# Patient Record
Sex: FEMALE | Race: WHITE | NOT HISPANIC OR LATINO | Employment: FULL TIME | URBAN - METROPOLITAN AREA
[De-identification: names, ages, dates, MRNs, and addresses within clinical notes are randomized per-mention and may not be internally consistent; named-entity substitution may affect disease eponyms.]

---

## 2017-01-04 ENCOUNTER — OFFICE VISIT (OUTPATIENT)
Dept: FAMILY MEDICINE | Facility: HOSPITAL | Age: 27
End: 2017-01-04
Attending: FAMILY MEDICINE
Payer: MEDICAID

## 2017-01-04 VITALS
RESPIRATION RATE: 20 BRPM | SYSTOLIC BLOOD PRESSURE: 118 MMHG | WEIGHT: 137.56 LBS | DIASTOLIC BLOOD PRESSURE: 69 MMHG | HEART RATE: 63 BPM | BODY MASS INDEX: 22.89 KG/M2

## 2017-01-04 DIAGNOSIS — G43.009 MIGRAINE WITHOUT AURA AND WITHOUT STATUS MIGRAINOSUS, NOT INTRACTABLE: ICD-10-CM

## 2017-01-04 DIAGNOSIS — Z30.41 ENCOUNTER FOR BIRTH CONTROL PILLS MAINTENANCE: Primary | ICD-10-CM

## 2017-01-04 PROCEDURE — 99213 OFFICE O/P EST LOW 20 MIN: CPT | Performed by: FAMILY MEDICINE

## 2017-01-04 RX ORDER — NORETHINDRONE ACETATE AND ETHINYL ESTRADIOL .02; 1 MG/1; MG/1
1 TABLET ORAL DAILY
Qty: 30 TABLET | Refills: 11 | Status: SHIPPED | OUTPATIENT
Start: 2017-01-04 | End: 2017-04-03

## 2017-01-04 RX ORDER — SUMATRIPTAN SUCCINATE 25 MG/1
25 TABLET ORAL
Qty: 30 TABLET | Refills: 3 | Status: SHIPPED | OUTPATIENT
Start: 2017-01-04

## 2017-01-04 NOTE — PROGRESS NOTES
I assume primary medical responsibility for this patient, I have reviewed the case history, findings, diagnosis and treatment plan with the resident and agree that the care is reasonable and necessary. This service has been performed by a resident without the presence of a teaching physician under the primary care exception  Ana Childress  1/4/2017

## 2017-01-04 NOTE — PROGRESS NOTES
Subjective:       Patient ID: Paige Kilgore is a 26 y.o. female.    Chief Complaint: Medication Refill    HPI   25 y/o female with PMH of migraines presents today for medication refill. Pt states she is feeling well and has no complaints today. She denies fever, chills, SOB, CP, n/v/d.     Review of Systems   Constitutional: Negative for chills and fever.   HENT: Negative for congestion, rhinorrhea and sore throat.    Eyes: Negative for discharge and redness.   Respiratory: Negative for cough, shortness of breath, wheezing and stridor.    Cardiovascular: Negative for chest pain, palpitations and leg swelling.   Gastrointestinal: Negative for abdominal distention, abdominal pain, blood in stool, constipation, diarrhea, nausea and vomiting.   Genitourinary: Negative for difficulty urinating, dysuria and hematuria.   Musculoskeletal: Negative for back pain and neck pain.   Skin: Negative for rash.   Neurological: Negative for dizziness, light-headedness and headaches.   Psychiatric/Behavioral: Negative for agitation, behavioral problems and confusion.   All other systems reviewed and are negative.      Objective:      Vitals:    01/04/17 1031   BP: 118/69   Pulse: 63   Resp: 20     Physical Exam   Constitutional: She is oriented to person, place, and time. She appears well-developed and well-nourished. No distress.   HENT:   Head: Normocephalic and atraumatic.   Right Ear: External ear normal.   Left Ear: External ear normal.   Nose: Nose normal.   Mouth/Throat: Oropharynx is clear and moist. No oropharyngeal exudate.   Eyes: Conjunctivae and EOM are normal. Pupils are equal, round, and reactive to light. Right eye exhibits no discharge. Left eye exhibits no discharge. No scleral icterus.   Neck: Normal range of motion. Neck supple. No tracheal deviation present. No thyromegaly present.   Cardiovascular: Normal rate, regular rhythm, normal heart sounds and intact distal pulses.  Exam reveals no gallop and no friction  rub.    No murmur heard.  Pulmonary/Chest: Effort normal and breath sounds normal. No respiratory distress. She has no wheezes. She has no rales. She exhibits no tenderness.   Abdominal: Soft. Bowel sounds are normal. She exhibits no distension and no mass. There is no tenderness.   Musculoskeletal: Normal range of motion. She exhibits no edema or tenderness.   Lymphadenopathy:     She has no cervical adenopathy.   Neurological: She is alert and oriented to person, place, and time.   Skin: Skin is warm. No rash noted. She is not diaphoretic. No erythema.   Psychiatric: She has a normal mood and affect. Her behavior is normal. Judgment and thought content normal.   Nursing note and vitals reviewed.      Assessment:       1. Encounter for birth control pills maintenance    2. Migraine without aura and without status migrainosus, not intractable        Plan:       Encounter for birth control pills maintenance    Migraine without aura and without status migrainosus, not intractable    Other orders  -     norethindrone-ethinyl estradiol (MICROGESTIN 1/20) 1-20 mg-mcg per tablet; Take 1 tablet by mouth once daily.  Dispense: 30 tablet; Refill: 11  -     sumatriptan (IMITREX) 25 MG Tab; Take 1 tablet (25 mg total) by mouth every 2 (two) hours as needed (do not exceed 100 mg per day).  Dispense: 30 tablet; Refill: 3    Pt was interviewed and examined. Refilled birth control and wrote for sumatriptan for migraines. Pt did not want to try propranolol for migraine prophylaxis because she didn't want to have to take medication every day and she doesn't feel like she gets the HA's often enough to warrant another medication. I discussed with her the pros and cons of different HA medications.   Return in about 6 months (around 7/4/2017).    1/4/2017 Jos Hopper M.D.  PGY1  Called Pt and dicussed her combined OCP use and her Hx of migraines. She stated that she's been having migraines since she was 5 yrs old and she doesn't  believe they are related. However, she was amenable to the idea of discussing other BC options to include nexplanon or IUD in 6 months at her next scheduled visit. Also advised pt not to take Sumatriptan until she is no longer taking estrogen containing BC as this may exacerbate her migraines via cerebral vasoconstriction. She agreed to the plan.  1/4/2017 Jos Hopper M.D.  PGY1

## 2017-02-09 ENCOUNTER — OFFICE VISIT (OUTPATIENT)
Dept: FAMILY MEDICINE | Facility: HOSPITAL | Age: 27
End: 2017-02-09
Attending: FAMILY MEDICINE
Payer: MEDICAID

## 2017-02-09 VITALS
HEART RATE: 85 BPM | RESPIRATION RATE: 20 BRPM | DIASTOLIC BLOOD PRESSURE: 66 MMHG | HEIGHT: 68 IN | WEIGHT: 131.81 LBS | BODY MASS INDEX: 19.98 KG/M2 | SYSTOLIC BLOOD PRESSURE: 113 MMHG

## 2017-02-09 DIAGNOSIS — G43.009 MIGRAINE WITHOUT AURA AND WITHOUT STATUS MIGRAINOSUS, NOT INTRACTABLE: ICD-10-CM

## 2017-02-09 DIAGNOSIS — A08.4 VIRAL GASTROENTERITIS: Primary | ICD-10-CM

## 2017-02-09 PROCEDURE — 99213 OFFICE O/P EST LOW 20 MIN: CPT | Performed by: FAMILY MEDICINE

## 2017-02-09 NOTE — MR AVS SNAPSHOT
[facility]  [address], [address]  [place] [address]  Phone: [phone]  Fax: [phone]                  Paige Kilgore   2017 11:00 AM   Office Visit    Description:  Female : 1990   Provider:  Jos Hopper MD   Department:  [facility]           Reason for Visit     Diarrhea                To Do List           Future Appointments        Provider Department Dept Phone    2017 10:20 AM Jonathan Richardson MD [facility] [phone]      Goals (5 Years of Data)     None      [facility]     [facility] Care [facility] -  Assistance  Registered nurses in the [facility] provide clinical advisement, health education, appointment booking, and other advisory services.  Call for this free service at [phone].             Medications           Message regarding Medications     Verify the changes and/or additions to your medication regime listed below are the same as discussed with your clinician today.  If any of these changes or additions are incorrect, please notify your healthcare provider.             Verify that the below list of medications is an accurate representation of the medications you are currently taking.  If none reported, the list may be blank. If incorrect, please contact your healthcare provider. Carry this list with you in case of emergency.           Current Medications     acyclovir (ZOVIRAX) 200 MG capsule Take 2 capsules (400 mg total) by mouth once daily.    butalbital-acetaminophen-caffeine -40 mg (FIORICET, ESGIC) -40 mg per tablet     MICROGESTIN FE /, 28, 1 mg-20 mcg (21)/75 mg (7) per tablet     norethindrone-ethinyl estradiol (MICROGESTIN /20) 1-20 mg-mcg per tablet Take 1 tablet by mouth once daily.    sumatriptan (IMITREX) 25 MG Tab Take 1 tablet (25 mg total) by mouth every 2 (two) hours as needed (do not exceed 100 mg per day).          "  Clinical Reference Information           Your Vitals Were     BP Pulse Resp Height Weight BMI    113/66 85 20 5' 8" (1.727 m) 59.8 kg (131 lb 13.4 oz) 20.05 kg/m2      Blood Pressure          Most Recent Value    BP  113/66      Allergies as of 2/9/2017     No Known Allergies      Immunizations Administered on Date of Encounter - 2/9/2017     None      [facility] Sign-Up     Activating your [facility] account is as easy as 1-2-3!     1) Visit [email or web address], select Sign Up Now, enter this activation code and your date of birth, then select Next.  [email or web address]  Expires: 3/26/2017 12:06 PM      2) Create a username and password to use when you visit [facility] in the future and select a security question in case you lose your password and select Next.    3) Enter your e-mail address and click Sign Up!    Additional Information  If you have questions, please e-mail [email or web address] or call [phone] to talk to our [facility] staff. Remember, [facility] is NOT to be used for urgent needs. For medical emergencies, dial 911.         Language Assistance Services     ATTENTION: Language assistance services are available, free of charge. Please call [phone].      ATENCIÓN: Si habla español, tiene a rodriguez disposición servicios gratuitos de asistencia lingüística. Llame al [phone].     CHÚ Ý: N?u b?n nói Ti?ng Vi?t, có các d?ch v? h? tr? ngôn ng? mi?n phí dành cho b?n. G?i s? [phone].         [facility] complies with applicable Federal civil rights laws and does not discriminate on the basis of race, color, national origin, age, disability, or sex.        "

## 2017-02-09 NOTE — PROGRESS NOTES
Subjective:       Patient ID: Paige Kilgore is a 26 y.o. female.    Chief Complaint: Diarrhea    HPI   Patient presents with complaint of diarrhea since last Wednesday (x1 week).  Describes sensation as that of severe campy pain, similar to that of having bad gas.  Pain is most severe after eating, and proceeds to diarrhea.  Diarrhea is accompanied by a clear mucous and is brown in color, but no blood.  Patient notes that she generally feels like she still needs to go more after completion of diarrhea. Frequency of diarrhea has decreased over the last few days: she has had diarrhea once today, had it twice yesterday, and was at least 2-3 times daily earlier in the course of illness.  Patient has not taken any OTC meds for diarrhea, and reports no recent changes in prescribed or OTC medications.  Has had no unusual foods recently, and no known sick contacts.  No nausea or vomiting associated with diarrhea. Pt has remained afebrile since symptoms began.     Patient also asks about a change in the packaging of her birthcontrol.  The most recent monthly package of Microgestin came without the usual week of placebo pills.  Patient is requesting guidance on how to take the birth control given the absence of placebo pills, and is interested in learning more about other birth control options.    Review of Systems   Constitutional: Negative for chills and fever.   HENT: Negative for congestion, rhinorrhea and sore throat.    Eyes: Negative for discharge and redness.   Respiratory: Negative for cough, shortness of breath, wheezing and stridor.    Cardiovascular: Negative for chest pain, palpitations and leg swelling.   Gastrointestinal: Positive for abdominal distention, abdominal pain and diarrhea. Negative for blood in stool, constipation, nausea and vomiting.   Genitourinary: Negative for difficulty urinating, dysuria and hematuria.   Musculoskeletal: Negative for back pain and neck pain.   Skin: Negative for rash.    Neurological: Negative for dizziness, light-headedness and headaches.   Psychiatric/Behavioral: Negative for agitation, behavioral problems and confusion.   All other systems reviewed and are negative.      Objective:      Vitals:    02/09/17 1105   BP: 113/66   Pulse: 85   Resp: 20     Physical Exam   Constitutional: She is oriented to person, place, and time. She appears well-developed and well-nourished. No distress.   HENT:   Head: Normocephalic and atraumatic.   Right Ear: External ear normal.   Left Ear: External ear normal.   Nose: Nose normal.   Mouth/Throat: Oropharynx is clear and moist. No oropharyngeal exudate.   Eyes: Conjunctivae and EOM are normal. Pupils are equal, round, and reactive to light. Right eye exhibits no discharge. Left eye exhibits no discharge. No scleral icterus.   Neck: Normal range of motion. Neck supple. No tracheal deviation present. No thyromegaly present.   Cardiovascular: Normal rate, regular rhythm, normal heart sounds and intact distal pulses.  Exam reveals no gallop and no friction rub.    No murmur heard.  Pulmonary/Chest: Effort normal and breath sounds normal. No respiratory distress. She has no wheezes. She has no rales. She exhibits no tenderness.   Abdominal: Soft. Bowel sounds are normal. She exhibits no distension and no mass. There is no tenderness.   Musculoskeletal: Normal range of motion. She exhibits no edema or tenderness.   Lymphadenopathy:     She has no cervical adenopathy.   Neurological: She is alert and oriented to person, place, and time.   Skin: Skin is warm. No rash noted. She is not diaphoretic. No erythema.   Psychiatric: She has a normal mood and affect. Her behavior is normal. Judgment and thought content normal.   Nursing note and vitals reviewed.      Assessment:       1. Viral gastroenteritis    2. Migraine without aura and without status migrainosus, not intractable        Plan:       Viral gastroenteritis    Migraine without aura and without  status migrainosus, not intractable    - Pt was interviewed and examined.  - History and physical exam are consistent with viral gastroenteritis. Edcuated pt on supportive treatment (fluids) and time course for gastroenteritis, she verbalized understanding.   - Gave handout for various BC options. Instructed pt to ask pharmacist for alternative OCP packaging.   - Imitrex for migraines.  - RTC in 2 weeks if symptoms have not improved or worsened.    Return in about 2 weeks (around 2/23/2017) for f/u of diarrhea. Pt will call to cancel if better. .

## 2017-04-03 ENCOUNTER — OFFICE VISIT (OUTPATIENT)
Dept: OBSTETRICS AND GYNECOLOGY | Facility: CLINIC | Age: 27
End: 2017-04-03
Attending: OBSTETRICS & GYNECOLOGY
Payer: MEDICAID

## 2017-04-03 ENCOUNTER — HOSPITAL ENCOUNTER (OUTPATIENT)
Dept: RADIOLOGY | Facility: HOSPITAL | Age: 27
Discharge: HOME | End: 2017-04-03
Attending: OBSTETRICS & GYNECOLOGY
Payer: MEDICAID

## 2017-04-03 VITALS
DIASTOLIC BLOOD PRESSURE: 72 MMHG | WEIGHT: 132.94 LBS | SYSTOLIC BLOOD PRESSURE: 109 MMHG | BODY MASS INDEX: 20.21 KG/M2

## 2017-04-03 DIAGNOSIS — N63.20 LEFT BREAST LUMP: ICD-10-CM

## 2017-04-03 DIAGNOSIS — Z00.00 ANNUAL PHYSICAL EXAM: Primary | ICD-10-CM

## 2017-04-03 PROCEDURE — 76642 ULTRASOUND BREAST LIMITED: CPT | Mod: 26,LT,, | Performed by: RADIOLOGY

## 2017-04-03 PROCEDURE — 99385 PREV VISIT NEW AGE 18-39: CPT | Mod: 25,1D,GY,S$PBB | Performed by: OBSTETRICS & GYNECOLOGY

## 2017-04-03 PROCEDURE — 99999 PR PBB SHADOW E&M-EST. PATIENT-LVL III: CPT | Mod: PBBFAC,,, | Performed by: OBSTETRICS & GYNECOLOGY

## 2017-04-03 PROCEDURE — 99202 OFFICE O/P NEW SF 15 MIN: CPT | Mod: S$PBB,,, | Performed by: OBSTETRICS & GYNECOLOGY

## 2017-04-03 PROCEDURE — 88175 CYTOPATH C/V AUTO FLUID REDO: CPT

## 2017-04-03 PROCEDURE — 76642 ULTRASOUND BREAST LIMITED: CPT | Mod: TC,LT

## 2017-04-03 NOTE — PROGRESS NOTES
CC: Well woman exam and problem    Paige Kilgore is a 26 y.o. female  presents for above.  LMP: Patient's last menstrual period was 2017..   Last pap about 2 years ago.  No history of abnormal pap.     Past Medical History:   Diagnosis Date    Herpes genitalis in women     Migraines      Past Surgical History:   Procedure Laterality Date     SECTION      LEG SURGERY       Social History     Social History    Marital status: Single     Spouse name: N/A    Number of children: N/A    Years of education: N/A     Occupational History    Not on file.     Social History Main Topics    Smoking status: Never Smoker    Smokeless tobacco: Not on file    Alcohol use Yes    Drug use: No    Sexual activity: Yes     Partners: Male     Birth control/ protection: Pill     Other Topics Concern    Not on file     Social History Narrative     Family History   Problem Relation Age of Onset    No Known Problems Father     No Known Problems Mother      OB History      Para Term  AB TAB SAB Ectopic Multiple Living    1 1                  /72  Wt 60.3 kg (132 lb 15 oz)  LMP 2017  BMI 20.21 kg/m2      ROS:  GENERAL: Denies weight gain or weight loss. Feeling well overall.   SKIN: Denies rash or lesions.   HEAD: Denies head injury or headache.   NODES: Denies enlarged lymph nodes.   CHEST: Denies chest pain or shortness of breath.   CARDIOVASCULAR: Denies palpitations or left sided chest pain.   ABDOMEN: No abdominal pain, constipation, diarrhea, nausea, vomiting or rectal bleeding.   URINARY: No frequency, dysuria, hematuria, or burning on urination.  REPRODUCTIVE: See HPI.   BREASTS: see below  HEMATOLOGIC: No easy bruisability or excessive bleeding.   MUSCULOSKELETAL: Denies joint pain or swelling.   NEUROLOGIC: Denies syncope or weakness.   PSYCHIATRIC: Denies depression, anxiety or mood swings.    PHYSICAL EXAM:  APPEARANCE: Well nourished, well developed, in no acute  distress.  AFFECT: WNL, alert and oriented x 3  SKIN: No acne or hirsutism  NECK: Neck symmetric without masses or thyromegaly  NODES: No inguinal, cervical, axillary, or femoral lymph node enlargement  CHEST: Good respiratory effect  ABDOMEN: Soft.  No tenderness or masses.  No hepatosplenomegaly.  No hernias.  BREASTS: see below  PELVIC: Normal external genitalia without lesions.  Normal hair distribution.  Adequate perineal body, normal urethral meatus.  Vagina moist and well rugated without lesions or discharge.  Cervix pink, without lesions, discharge or tenderness.  No significant cystocele or rectocele.  Bimanual exam shows uterus to be normal size, regular, mobile and nontender.  Adnexa without masses or tenderness.    EXTREMITIES: No edema.    ASSESSMENT & PLAN:  See below  Patient was counseled today on A.C.S. Pap guidelines and recommendations for yearly pelvic exams, mammograms and monthly self breast exams; to see her PCP for other health maintenance.           PATIENT ALSO PRESENTS FOR A PROBLEM:    Patient reports a left breast lump for 2 weeks.  She thinks it is now gone.  It was tender - no pain now.  No history of breast lumps.    PMH, PSH, MEDS:  above  Review of patient's allergies indicates:  No Known Allergies    ROS:  See above    /72  Wt 60.3 kg (132 lb 15 oz)  LMP 03/27/2017  BMI 20.21 kg/m2    PE:  See above  BREASTS:  1 cm tender mobile lump at 2 o'clock left breast.  No masses on right.  No nipple discharge, skin changes or LAD bilaterally    DIAGNOSIS:  1. Annual physical exam  Liquid-based pap smear, screening   2. Left breast lump  US Breast Left Complete       COUNSELING:  Discussed self breast exams, decreasing caffeine intake.  Precautions given.

## 2017-04-03 NOTE — MR AVS SNAPSHOT
[facility] [address]  [address]  [address]  [place] [address]  Phone: [phone]  Fax: [phone]                  Paige Kilgore   4/3/2017 10:00 AM   Office Visit    Description:  Female : 1990   Provider:  Isabelle Franks MD   Department:  [facility] [address]           Reason for Visit     Well Woman                To Do List           Future Appointments        Provider Department Dept Phone    4/3/2017 10:00 AM Isabelle Franks MD [facility] [address] [phone]      Goals (5 Years of Data)     None      [facility]     [facility] Call Nurse Care Line -  Assistance  Unless otherwise directed by your provider, please contact [facility], our nurse care line that is available for  assistance.     Registered nurses in the [facility] provide: appointment scheduling, clinical advisement, health education, and other advisory services.  Call: [phone] (toll free)               Medications           Message regarding Medications     Verify the changes and/or additions to your medication regime listed below are the same as discussed with your clinician today.  If any of these changes or additions are incorrect, please notify your healthcare provider.        STOP taking these medications     norethindrone-ethinyl estradiol (MICROGESTIN ) 1-20 mg-mcg per tablet Take 1 tablet by mouth once daily.           Verify that the below list of medications is an accurate representation of the medications you are currently taking.  If none reported, the list may be blank. If incorrect, please contact your healthcare provider. Carry this list with you in case of emergency.           Current Medications     acyclovir (ZOVIRAX) 200 MG capsule Take 2 capsules (400 mg total) by mouth once daily.    butalbital-acetaminophen-caffeine -40 mg (FIORICET, ESGIC) -40 mg per tablet     MICROGESTIN FE /, 28, 1 mg-20 mcg (21)/75 mg (7) per  tablet     sumatriptan (IMITREX) 25 MG Tab Take 1 tablet (25 mg total) by mouth every 2 (two) hours as needed (do not exceed 100 mg per day).           Clinical Reference Information           Your Vitals Were     BP Weight Last Period BMI       109/72 60.3 kg (132 lb 15 oz) 03/27/2017 20.21 kg/m2       Blood Pressure          Most Recent Value    BP  109/72      Allergies as of 4/3/2017     No Known Allergies      Immunizations Administered on Date of Encounter - 4/3/2017     None      [facility] Sign-Up     Activating your [facility] account is as easy as 1-2-3!     1) Visit [email or web address], select Sign Up Now, enter this activation code and your date of birth, then select Next.  [email or web address]  Expires: 5/18/2017  9:46 AM      2) Create a username and password to use when you visit [facility] in the future and select a security question in case you lose your password and select Next.    3) Enter your e-mail address and click Sign Up!    Additional Information  If you have questions, please e-mail [email or web address] or call [phone] to talk to our [facility] staff. Remember, [facility] is NOT to be used for urgent needs. For medical emergencies, dial 911.         Language Assistance Services     ATTENTION: Language assistance services are available, free of charge. Please call [phone].      ATENCIÓN: Si habla español, tiene a rodriguez disposición servicios gratuitos de asistencia lingüística. Llame al [phone].     [facility] Ý: N?u b?n nói Ti?ng Vi?t, có các d?ch v? h? tr? ngôn ng? mi?n phí dành cho b?n. G?i s? [phone].         [facility] [address] complies with applicable Federal civil rights laws and does not discriminate on the basis of race, color, national origin, age, disability, or sex.

## 2017-07-05 ENCOUNTER — OFFICE VISIT (OUTPATIENT)
Dept: FAMILY MEDICINE | Facility: HOSPITAL | Age: 27
End: 2017-07-05
Attending: FAMILY MEDICINE
Payer: MEDICAID

## 2017-07-05 VITALS
BODY MASS INDEX: 22.63 KG/M2 | SYSTOLIC BLOOD PRESSURE: 130 MMHG | WEIGHT: 135.81 LBS | HEART RATE: 74 BPM | DIASTOLIC BLOOD PRESSURE: 70 MMHG | HEIGHT: 65 IN

## 2017-07-05 DIAGNOSIS — B37.31 YEAST INFECTION OF THE VAGINA: ICD-10-CM

## 2017-07-05 DIAGNOSIS — R31.9 URINARY TRACT INFECTION WITH HEMATURIA, SITE UNSPECIFIED: Primary | ICD-10-CM

## 2017-07-05 DIAGNOSIS — N39.0 URINARY TRACT INFECTION WITH HEMATURIA, SITE UNSPECIFIED: Primary | ICD-10-CM

## 2017-07-05 PROCEDURE — 99213 OFFICE O/P EST LOW 20 MIN: CPT | Performed by: FAMILY MEDICINE

## 2017-07-05 RX ORDER — FLUCONAZOLE 150 MG/1
150 TABLET ORAL DAILY
Qty: 1 TABLET | Refills: 0 | Status: SHIPPED | OUTPATIENT
Start: 2017-07-05 | End: 2017-07-06

## 2017-07-05 RX ORDER — SULFAMETHOXAZOLE AND TRIMETHOPRIM 800; 160 MG/1; MG/1
TABLET ORAL
COMMUNITY
Start: 2017-07-02 | End: 2017-07-05

## 2017-07-05 RX ORDER — ESTAZOLAM 2 MG/1
TABLET ORAL
COMMUNITY
Start: 2017-06-19

## 2017-07-05 NOTE — PROGRESS NOTES
"Subjective:       Patient ID: Paige Kilgore is a 26 y.o. female.    Chief Complaint: Urinary Tract Infection and Vaginitis    HPI   27 y/o F with PMH of anxiety here with dysuria, urgency, and cloudy/foul smelling urine x 6 days. Pt went to urgent care 4 days ago and was given Bactrim (3 day course) and pyridium for bladder spasms. Pt finished both medications as prescribed and still reports urinary symptoms. She states that she does feel "a little better" in terms of less painful with voiding urine. She denies dyspareunia or vaginal discharge other than her usual white-davon discharge. She also reports vaginal itching which pre-dated her urinary symptoms by about one week. She states she has never had a UTI in the past, however she has had vaginal yeast infections before. She is still taking her Acyclovir daily for HSV outbreak prophylaxis. She denies any fever, chills, HA, SOB, CP, n/v/d.     Review of Systems   Constitutional: Negative for chills and fever.   HENT: Negative for congestion, rhinorrhea and sore throat.    Eyes: Negative for discharge and redness.   Respiratory: Negative for cough, shortness of breath, wheezing and stridor.    Cardiovascular: Negative for chest pain, palpitations and leg swelling.   Gastrointestinal: Negative for abdominal distention, abdominal pain, blood in stool, constipation, diarrhea, nausea and vomiting.   Endocrine: Negative for polyuria.   Genitourinary: Positive for dysuria, frequency, hematuria and urgency. Negative for difficulty urinating, dyspareunia and vaginal discharge.   Musculoskeletal: Negative for back pain and neck pain.   Skin: Negative for rash.   Neurological: Negative for dizziness, light-headedness and headaches.   Psychiatric/Behavioral: Negative for agitation, behavioral problems and confusion.   All other systems reviewed and are negative.      Objective:      Vitals:    07/05/17 1516   BP: 130/70   Pulse: 74     Physical Exam   Constitutional: She is " oriented to person, place, and time. She appears well-developed and well-nourished. No distress.   HENT:   Head: Normocephalic and atraumatic.   Eyes: Conjunctivae and EOM are normal. Pupils are equal, round, and reactive to light. Right eye exhibits no discharge. Left eye exhibits no discharge. No scleral icterus.   Neck: Normal range of motion. Neck supple. No tracheal deviation present. No thyromegaly present.   Cardiovascular: Normal rate, regular rhythm, normal heart sounds and intact distal pulses.  Exam reveals no gallop and no friction rub.    No murmur heard.  Pulmonary/Chest: Effort normal and breath sounds normal. No respiratory distress. She has no wheezes. She has no rales. She exhibits no tenderness.   Abdominal: Soft. Bowel sounds are normal. She exhibits no distension and no mass. There is no tenderness.   Musculoskeletal: Normal range of motion. She exhibits no edema or tenderness.   Lymphadenopathy:     She has no cervical adenopathy.   Neurological: She is alert and oriented to person, place, and time.   Skin: Skin is warm. No rash noted. She is not diaphoretic. No erythema.   Psychiatric: She has a normal mood and affect. Her behavior is normal. Judgment and thought content normal.   Nursing note and vitals reviewed.    Pelvic exam was not performed.    Assessment:       1. Urinary tract infection with hematuria, site unspecified    2. Yeast infection of the vagina        Plan:       Urinary tract infection with hematuria, site unspecified  -     Urinalysis; Future; Expected date: 07/10/2017  No signs of systemic infection. No CVA tenderness. Patient appears well. UA today showing UTI. Patient likely with UTI resistant to Bactrim. Wrote for Ciprofloxacin 500 mg ER PO qd x 5 days. Educated pt on potential GI side effects. Ordered repeat UA to be performed after completion of Abx (on/after 7/10/17) for test of cure considering the patients course (failing Bactrim).     Yeast infection of the  vagina  Fluconazole 150 mg PO x 1 dose.      Other orders  -     ciprofloxacin (CIPRO XR) 500 mg tablet; Take 1 tablet (500 mg total) by mouth once daily.  Dispense: 5 tablet; Refill: 0  -     fluconazole (DIFLUCAN) 150 MG Tab; Take 1 tablet (150 mg total) by mouth once daily.  Dispense: 1 tablet; Refill: 0    Return if symptoms worsen or fail to improve.    7/5/2017 Jos Hopper M.D.  [facility] Resident PGY-2

## 2017-07-10 ENCOUNTER — LAB VISIT (OUTPATIENT)
Dept: LAB | Facility: HOSPITAL | Age: 27
End: 2017-07-10
Attending: FAMILY MEDICINE
Payer: MEDICAID

## 2017-07-10 DIAGNOSIS — R31.9 URINARY TRACT INFECTION WITH HEMATURIA, SITE UNSPECIFIED: ICD-10-CM

## 2017-07-10 DIAGNOSIS — N39.0 URINARY TRACT INFECTION WITH HEMATURIA, SITE UNSPECIFIED: ICD-10-CM

## 2017-07-10 LAB
BILIRUB UR QL STRIP: NEGATIVE
CLARITY UR: CLEAR
COLOR UR: YELLOW
GLUCOSE UR QL STRIP: NEGATIVE
HGB UR QL STRIP: NEGATIVE
KETONES UR QL STRIP: NEGATIVE
LEUKOCYTE ESTERASE UR QL STRIP: NEGATIVE
NITRITE UR QL STRIP: NEGATIVE
PH UR STRIP: 6 [PH] (ref 5–8)
PROT UR QL STRIP: ABNORMAL
SP GR UR STRIP: 1.02 (ref 1–1.03)
URN SPEC COLLECT METH UR: ABNORMAL
UROBILINOGEN UR STRIP-ACNC: NEGATIVE EU/DL

## 2017-07-10 PROCEDURE — 81003 URINALYSIS AUTO W/O SCOPE: CPT
